# Patient Record
Sex: FEMALE | URBAN - METROPOLITAN AREA
[De-identification: names, ages, dates, MRNs, and addresses within clinical notes are randomized per-mention and may not be internally consistent; named-entity substitution may affect disease eponyms.]

---

## 2020-11-05 ENCOUNTER — EMERGENCY (EMERGENCY)
Facility: HOSPITAL | Age: 45
LOS: 1 days | Discharge: HOME | End: 2020-11-05
Attending: EMERGENCY MEDICINE | Admitting: EMERGENCY MEDICINE
Payer: MEDICAID

## 2020-11-05 VITALS
SYSTOLIC BLOOD PRESSURE: 121 MMHG | HEART RATE: 115 BPM | RESPIRATION RATE: 18 BRPM | OXYGEN SATURATION: 99 % | DIASTOLIC BLOOD PRESSURE: 62 MMHG | TEMPERATURE: 98 F

## 2020-11-05 LAB
ALBUMIN SERPL ELPH-MCNC: 3.3 G/DL — SIGNIFICANT CHANGE UP (ref 3.3–5)
ALP SERPL-CCNC: 52 U/L — SIGNIFICANT CHANGE UP (ref 40–120)
ALT FLD-CCNC: 17 U/L — SIGNIFICANT CHANGE UP (ref 4–33)
ANION GAP SERPL CALC-SCNC: 10 MMO/L — SIGNIFICANT CHANGE UP (ref 7–14)
APAP SERPL-MCNC: < 15 UG/ML — LOW (ref 15–25)
AST SERPL-CCNC: 24 U/L — SIGNIFICANT CHANGE UP (ref 4–32)
BASOPHILS # BLD AUTO: 0.01 K/UL — SIGNIFICANT CHANGE UP (ref 0–0.2)
BASOPHILS NFR BLD AUTO: 0.2 % — SIGNIFICANT CHANGE UP (ref 0–2)
BASOPHILS NFR SPEC: 0 % — SIGNIFICANT CHANGE UP (ref 0–2)
BILIRUB SERPL-MCNC: 0.8 MG/DL — SIGNIFICANT CHANGE UP (ref 0.2–1.2)
BUN SERPL-MCNC: 20 MG/DL — SIGNIFICANT CHANGE UP (ref 7–23)
CALCIUM SERPL-MCNC: 8.6 MG/DL — SIGNIFICANT CHANGE UP (ref 8.4–10.5)
CHLORIDE SERPL-SCNC: 105 MMOL/L — SIGNIFICANT CHANGE UP (ref 98–107)
CK SERPL-CCNC: 329 U/L — HIGH (ref 25–170)
CO2 SERPL-SCNC: 22 MMOL/L — SIGNIFICANT CHANGE UP (ref 22–31)
CREAT SERPL-MCNC: 0.65 MG/DL — SIGNIFICANT CHANGE UP (ref 0.5–1.3)
EOSINOPHIL # BLD AUTO: 0.07 K/UL — SIGNIFICANT CHANGE UP (ref 0–0.5)
EOSINOPHIL NFR BLD AUTO: 1.2 % — SIGNIFICANT CHANGE UP (ref 0–6)
EOSINOPHIL NFR FLD: 0.9 % — SIGNIFICANT CHANGE UP (ref 0–6)
ETHANOL BLD-MCNC: < 10 MG/DL — SIGNIFICANT CHANGE UP
GIANT PLATELETS BLD QL SMEAR: PRESENT — SIGNIFICANT CHANGE UP
GLUCOSE SERPL-MCNC: 87 MG/DL — SIGNIFICANT CHANGE UP (ref 70–99)
HCG SERPL-ACNC: < 5 MIU/ML — SIGNIFICANT CHANGE UP
HCT VFR BLD CALC: 30.3 % — LOW (ref 34.5–45)
HGB BLD-MCNC: 10 G/DL — LOW (ref 11.5–15.5)
IMM GRANULOCYTES NFR BLD AUTO: 0.3 % — SIGNIFICANT CHANGE UP (ref 0–1.5)
LYMPHOCYTES # BLD AUTO: 2.64 K/UL — SIGNIFICANT CHANGE UP (ref 1–3.3)
LYMPHOCYTES # BLD AUTO: 44.4 % — HIGH (ref 13–44)
LYMPHOCYTES NFR SPEC AUTO: 37.7 % — SIGNIFICANT CHANGE UP (ref 13–44)
MCHC RBC-ENTMCNC: 21.4 PG — LOW (ref 27–34)
MCHC RBC-ENTMCNC: 33 % — SIGNIFICANT CHANGE UP (ref 32–36)
MCV RBC AUTO: 64.7 FL — LOW (ref 80–100)
MONOCYTES # BLD AUTO: 0.68 K/UL — SIGNIFICANT CHANGE UP (ref 0–0.9)
MONOCYTES NFR BLD AUTO: 11.4 % — SIGNIFICANT CHANGE UP (ref 2–14)
MONOCYTES NFR BLD: 4.4 % — SIGNIFICANT CHANGE UP (ref 2–9)
NEUTROPHIL AB SER-ACNC: 50.9 % — SIGNIFICANT CHANGE UP (ref 43–77)
NEUTROPHILS # BLD AUTO: 2.52 K/UL — SIGNIFICANT CHANGE UP (ref 1.8–7.4)
NEUTROPHILS NFR BLD AUTO: 42.5 % — LOW (ref 43–77)
NRBC # FLD: 0 K/UL — SIGNIFICANT CHANGE UP (ref 0–0)
PLATELET # BLD AUTO: 196 K/UL — SIGNIFICANT CHANGE UP (ref 150–400)
PLATELET COUNT - ESTIMATE: NORMAL — SIGNIFICANT CHANGE UP
PMV BLD: 9.6 FL — SIGNIFICANT CHANGE UP (ref 7–13)
POTASSIUM SERPL-MCNC: 3.9 MMOL/L — SIGNIFICANT CHANGE UP (ref 3.5–5.3)
POTASSIUM SERPL-SCNC: 3.9 MMOL/L — SIGNIFICANT CHANGE UP (ref 3.5–5.3)
PROT SERPL-MCNC: 6.2 G/DL — SIGNIFICANT CHANGE UP (ref 6–8.3)
RBC # BLD: 4.68 M/UL — SIGNIFICANT CHANGE UP (ref 3.8–5.2)
RBC # FLD: 15.7 % — HIGH (ref 10.3–14.5)
SALICYLATES SERPL-MCNC: < 5 MG/DL — LOW (ref 15–30)
SARS-COV-2 RNA SPEC QL NAA+PROBE: SIGNIFICANT CHANGE UP
SODIUM SERPL-SCNC: 137 MMOL/L — SIGNIFICANT CHANGE UP (ref 135–145)
TARGETS BLD QL SMEAR: SLIGHT — SIGNIFICANT CHANGE UP
TSH SERPL-MCNC: 1.91 UIU/ML — SIGNIFICANT CHANGE UP (ref 0.27–4.2)
VARIANT LYMPHS # BLD: 6.1 % — SIGNIFICANT CHANGE UP
WBC # BLD: 5.94 K/UL — SIGNIFICANT CHANGE UP (ref 3.8–10.5)
WBC # FLD AUTO: 5.94 K/UL — SIGNIFICANT CHANGE UP (ref 3.8–10.5)

## 2020-11-05 PROCEDURE — 93010 ELECTROCARDIOGRAM REPORT: CPT

## 2020-11-05 PROCEDURE — 99285 EMERGENCY DEPT VISIT HI MDM: CPT | Mod: 25

## 2020-11-05 RX ORDER — HALOPERIDOL DECANOATE 100 MG/ML
5 INJECTION INTRAMUSCULAR ONCE
Refills: 0 | Status: COMPLETED | OUTPATIENT
Start: 2020-11-05 | End: 2020-11-05

## 2020-11-05 RX ADMIN — Medication 2 MILLIGRAM(S): at 14:00

## 2020-11-05 RX ADMIN — HALOPERIDOL DECANOATE 5 MILLIGRAM(S): 100 INJECTION INTRAMUSCULAR at 14:00

## 2020-11-05 NOTE — ED PROVIDER NOTE - PHYSICAL EXAMINATION
tunneling lesions to upper back with areas scabbed over areas as well as red raised areas and areas with vesicles upper back multiple areas of excoriation.  No erythema multiforme, migrans or concerning features.

## 2020-11-05 NOTE — ED BEHAVIORAL HEALTH NOTE - BEHAVIORAL HEALTH NOTE
Met with patient. Patient appears sedated, falling asleep and mumbling words. She reports she is "itchy," and requested cream for her back. She denied PPH or any current psychiatric symptoms but at this time required prompting multiple times due to sedation. Unable to fully evaluate patient at this time.     Reviewed Psyckes- Patient is a 45 year old female with a psychiatric hx of Schizophrenia. Last hospitalized at [facility] 7/18-8/10 2020. PMH per le- HTN, hx of scabies 10/19     Medication history: Risperdal, Haldol, Geodon, Hydroxyzine, Haldol Dec. Last prescribed Risperdal 4mg BID 8/10/20.     No [place] .     No collateral available. Met with patient. Patient appears sedated, falling asleep and mumbling words. She reports she is "itchy," and requested cream for her back. She denied SI/HI. She denied going through mailboxes (per triage note was taking mail from mailboxes). She required prompting multiple times due to sedation. Unable to fully evaluate patient at this time due to sedation.     Reviewed Psyckes- Patient is a 45 year old female with a psychiatric hx of Schizophrenia. Last hospitalized at [facility] 7/18-8/10 2020. PMH per psadonis- HTN, hx of scabies 10/19     Medication history: Risperdal, Haldol, Geodon, Hydroxyzine, Haldol Dec. Last prescribed Risperdal 4mg BID 8/10/20.     No [place] .     No collateral available.

## 2020-11-05 NOTE — ED ADULT TRIAGE NOTE - CHIEF COMPLAINT QUOTE
[employer] called for compliant of pt. going through people's mailboxes. When PD arrived pt. started running. EMS states pt. denying all medical history. As per PD, pt. was last hospitalized Nov '19 for schizophrenia. Pt. yelling and cursing in triage. Unable to get vitals.  NP Parish notified and pt. brought to  for eval.

## 2020-11-05 NOTE — ED ADULT NURSE NOTE - OBJECTIVE STATEMENT
The patient is a 45y Female BIB EMS/[employer] (handcuffed, not arrested) for  psychiatric evaluation. Pt was found by police near [address] [place] [address] and [facility] stealing mail from residential mailboxes. Pt agitated on arrival, cursing staff, using homophopic slurs. Not answering questions.

## 2020-11-05 NOTE — ED PROVIDER NOTE - CLINICAL SUMMARY MEDICAL DECISION MAKING FREE TEXT BOX
45 year old female history of schizophrenia, htn per PD/EMS presents for erratic behavior, concern for decompensation of schizophrenia.  Skin lesions concerning for scabies.  Due to agitation to ensure safety of patient and staff, patient medicated.  Will transfer to main ED for isolation of scabies, consult psychiatry, do psych labs / ekg 45 year old female history of schizophrenia, htn per PD/EMS presents for erratic behavior, concern for decompensation of schizophrenia.  Skin lesions non-concerning.  Due to agitation to ensure safety of patient and staff, patient medicated.  Medical evaluation performed. There is no clinical evidence of intoxication or any acute medical problem requiring immediate intervention. Patient is awaiting psychiatric consultation. Final disposition will be determined by psychiatrist.

## 2020-11-05 NOTE — ED BEHAVIORAL HEALTH NOTE - BEHAVIORAL HEALTH NOTE
Worker called numbers provided by DEVYN Lugo for collateral information. Worker called [phone] and line was continuously busy. Worker then called [phone] and person picked up states she does not know patient. Worker then called [phone] and left a message for call back on voicemail.

## 2020-11-05 NOTE — ED PROVIDER NOTE - OBJECTIVE STATEMENT
45 year old female history of schizophrenia, htn per PD/EMS presents for erratic behavior.  PD reports that patient was going house to house and taking mail from mailboxes and screaming.  PD reports upon their arrival patient ran away from them and had to be chased and detained until EMS arrived.  Patient initially agitated, screaming at staff and refusing to talk to provider. Patient only complaint is that she is pruritic due to lesions on her back.

## 2020-11-05 NOTE — ED BEHAVIORAL HEALTH NOTE - BEHAVIORAL HEALTH NOTE
attempted to re-evaluate this Pt bedside at 630PM but she remains sedated.     per chart review: 45/F with hx of Schizophrenia, multiple in-Pt psych admissions with latest admission at [facility] in 7/18-8/10 2020.  Today, presented to the ED BIB EMS accompanied by police after 911 was activated as Pt had been "going through people's mailboxes".   EMS reported that onsite upon police's arrival, the Pt started running.  On initial presentation, the Pt was uncooperative, yelling and cursing in triage.  She refuse to have VS done.   Staff attempted to verbally redirect her multiple times but she became increasingly agitated whilst at the main  ED.  As Pt did not respond to these encouragement and her attitude was getting worse, she ended up being medicated with Haldol 5mg IM + Ativan 2mg IM at 235PM.     Vital Signs Last 24 Hrs  T(C): 36.7 (05 Nov 2020 14:00), Max: 36.7 (05 Nov 2020 14:00)  T(F): 98 (05 Nov 2020 14:00), Max: 98 (05 Nov 2020 14:00)  HR: 115 (05 Nov 2020 14:00) (115 - 115)  BP: 121/62 (05 Nov 2020 14:00) (121/62 - 121/62)  BP(mean): --  RR: 18 (05 Nov 2020 14:00) (18 - 18)  SpO2: 99% (05 Nov 2020 14:00) (99% - 99%)    LABS:                        10.0   5.94  )-----------( 196      ( 05 Nov 2020 17:34 )             30.3     05 Nov 2020 17:34    137    |  105    |  20     ----------------------------<  87     3.9     |  22     |  0.65     Ca    8.6        05 Nov 2020 17:34    TPro  6.2    /  Alb  3.3    /  TBili  0.8    /  DBili  x      /  AST  24     /  ALT  17     /  AlkPhos  52     05 Nov 2020 17:34        PLAN:   to reassess once sensorium improves

## 2020-11-05 NOTE — ED PROVIDER NOTE - PROGRESS NOTE DETAILS
Patient now calm and cooperative after medication administered. Dr. French Shelley DO (ED ATTENDING): Patient has been sleeping and sedated for many hours but now patient now more awake but still disorganized and psychotic. Unclear baseline and psych unable to get collateral. Psych team requesting more haldol and ativan PO then will re-assess in morning to determine ultimate disposition MD CHO:  I received s/o on this pt from Dr. Shelley.  Pt pending re-eval by psych.  Discussed with psych attg, pt ready for dc, they will provide metrocard.

## 2020-11-05 NOTE — ED PROVIDER NOTE - PATIENT PORTAL LINK FT
You can access the FollowMyHealth Patient Portal offered by [facility] by registering at the following website: [email or web address]. By joining [facility]’s FollowMyHealth portal, you will also be able to view your health information using other applications (apps) compatible with our system.

## 2020-11-06 VITALS
HEART RATE: 78 BPM | OXYGEN SATURATION: 99 % | TEMPERATURE: 98 F | DIASTOLIC BLOOD PRESSURE: 62 MMHG | SYSTOLIC BLOOD PRESSURE: 147 MMHG | RESPIRATION RATE: 18 BRPM

## 2020-11-06 DIAGNOSIS — F20.9 SCHIZOPHRENIA, UNSPECIFIED: ICD-10-CM

## 2020-11-06 LAB
SARS-COV-2 IGG SERPL QL IA: NEGATIVE — SIGNIFICANT CHANGE UP
SARS-COV-2 IGM SERPL IA-ACNC: <3.8 AU/ML — SIGNIFICANT CHANGE UP

## 2020-11-06 PROCEDURE — 90792 PSYCH DIAG EVAL W/MED SRVCS: CPT

## 2020-11-06 RX ORDER — HALOPERIDOL DECANOATE 100 MG/ML
5 INJECTION INTRAMUSCULAR ONCE
Refills: 0 | Status: COMPLETED | OUTPATIENT
Start: 2020-11-06 | End: 2020-11-06

## 2020-11-06 RX ORDER — ACETAMINOPHEN 500 MG
650 TABLET ORAL ONCE
Refills: 0 | Status: COMPLETED | OUTPATIENT
Start: 2020-11-06 | End: 2020-11-06

## 2020-11-06 RX ADMIN — Medication 2 MILLIGRAM(S): at 05:56

## 2020-11-06 RX ADMIN — HALOPERIDOL DECANOATE 5 MILLIGRAM(S): 100 INJECTION INTRAMUSCULAR at 05:56

## 2020-11-06 RX ADMIN — Medication 650 MILLIGRAM(S): at 05:56

## 2020-11-06 NOTE — ED BEHAVIORAL HEALTH ASSESSMENT NOTE - HPI (INCLUDE ILLNESS QUALITY, SEVERITY, DURATION, TIMING, CONTEXT, MODIFYING FACTORS, ASSOCIATED SIGNS AND SYMPTOMS)
45 year old woman, history of schizophrenia, last admission was 7/18-8/10 2020 at [facility], presenting with agitation and disorganized speech and behavior. The patient was brought to the ED by EMS after she was found going into neighborhood mailboxes and running from the police.    On interview the patient is guarded and hostile. She remained in bed, covered in a blanket with only one eye exposed. She reports that she is in the hospital for back pain, which is chronic. When asked about her mood, she replied "you have to check my diet, because you eat what you feel". She denies AVH, SIIP/HIIP. She became increasingly hostile and deflected questions answering with "look in the chart". The interview was ended due to patient's increasing agitation.    As per Le- Patient is a 45 year old female with a psychiatric hx of Schizophrenia. Last hospitalized at [facility] 7/18-8/10 2020. PMH per le- HTN, hx of scabies 10/19, Medication history: Risperdal, Haldol, Geodon, Hydroxyzine, Haldol Dec. Last prescribed Risperdal 4mg BID 8/10/20.    The patient was unable to provide sources for further collateral 45 year old woman, history of schizophrenia, last admission was 7/18-8/10 2020 at [facility], presenting with agitation and disorganized speech and behavior. The patient was brought to the ED by EMS after she was found going into neighborhood mailboxes and running from the police.    On interview the patient is guarded and hostile. She remained in bed, covered in a blanket with only one eye exposed. She reports that she is in the hospital for back pain, which is chronic. When asked about her mood, she replied "you have to check my diet, because you eat what you feel". She denies AVH, SIIP/HIIP. She became increasingly hostile and deflected questions answering with "look in the chart". The interview was ended due to patient's increasing agitation.    As per Le- Patient is a 45 year old female with a psychiatric hx of Schizophrenia. Last hospitalized at [facility] 7/18-8/10 2020. PMH per le- HTN, hx of scabies 10/19, Medication history: Risperdal, Haldol, Geodon, Hydroxyzine, Haldol Dec. Last prescribed Risperdal 4mg BID 8/10/20.    The patient was unable to provide sources for further collateral    Pt was reassessed at 8:45:  She is A & O x 4, calmer, cooperative with good eye contact, no PMA or PMR.  She reports mood as “good”, affect is reactive, congruent. She denies EMS report of her going through other people’s mailboxes, reports that she came to the hospital because she was having feet and back pain with pruritus.  She reports lives with her family in [place], but unable to furnish any phone numbers or further collateral.  Pt denies PPH, denies prescribed or taking any medications.  She could not give psychiatrist name, but reports that she went to a clinic 2 weeks ago in [place] but found out her insurance was inactive.  Writer furnish information on [employer]SI office where she can go to have her insurance issues revisited. Patient denies current symptoms of depression, antonia, anxiety, psychosis, suicidal/homicidal ideations, intent or plans, denies auditory/visual hallucinations and there is no evidence of acute mood or psychotic episode on exam. Pt reports feeling better, is asking to go home.  She is oriented to situation and verbally agreed to f/u with her medical/psychiatric care. 45 year old woman, history of schizophrenia, last admission was 7/18-8/10 2020 at [facility], presenting with agitation and disorganized speech and behavior. The patient was brought to the ED by EMS after she was found going into neighborhood mailboxes and running from the police.    On interview the patient is guarded and hostile. She remained in bed, covered in a blanket with only one eye exposed. She reports that she is in the hospital for back pain, which is chronic. When asked about her mood, she replied "you have to check my diet, because you eat what you feel". She denies AVH, SIIP/HIIP. She became increasingly hostile and deflected questions answering with "look in the chart". The interview was ended due to patient's increasing agitation.    As per Le- Patient is a 45 year old female with a psychiatric hx of Schizophrenia. Last hospitalized at [facility] 7/18-8/10 2020. PMH per le- HTN, hx of scabies 10/19, Medication history: Risperdal, Haldol, Geodon, Hydroxyzine, Haldol Dec. Last prescribed Risperdal 4mg BID 8/10/20.    The patient was unable to provide sources for further collateral    Pt was reassessed at 8:45:  She is A & O x 4, calmer, cooperative with good eye contact, no PMA or PMR.  She reports mood as “good”, affect is reactive, congruent. She denies EMS report of her going through other people’s mailboxes, reports that she came to the hospital because she was having feet and back pain with pruritus.  She reports lives with her family in [place], but unable to furnish any phone numbers or further collateral.  Pt denies PPH, denies prescribed or taking any medications, denies current substance use.   She could not give psychiatrist name, but reports that she went to a clinic 2 weeks ago in [place] but found out her insurance was inactive.  Writer furnish information on SSI office where she can go to have her insurance issues revisited. Patient denies current symptoms of depression, antonia, anxiety, psychosis, suicidal/homicidal ideations, intent or plans, denies auditory/visual hallucinations and there is no evidence of acute mood or psychotic episode on exam. Pt reports feeling better, is asking to go home.  She is oriented to situation and verbally agreed to f/u with her medical/psychiatric care. 45 year old woman, history of schizophrenia, last admission was 7/18-8/10 2020 at [facility], presenting with agitation and disorganized speech and behavior. The patient was brought to the ED by EMS after she was found going into neighborhood mailboxes and running from the police.    On interview the patient is guarded and hostile. She remained in bed, covered in a blanket with only one eye exposed. She reports that she is in the hospital for back pain, which is chronic. When asked about her mood, she replied "you have to check my diet, because you eat what you feel". She denies AVH, SIIP/HIIP. She became increasingly hostile and deflected questions answering with "look in the chart". The interview was ended due to patient's increasing agitation.    As per Le- Patient is a 45 year old female with a psychiatric hx of Schizophrenia. Last hospitalized at [facility] 7/18-8/10 2020. PMH per le- HTN, hx of scabies 10/19, Medication history: Risperdal, Haldol, Geodon, Hydroxyzine, Haldol Dec. Last prescribed Risperdal 4mg BID 8/10/20.    The patient was unable to provide sources for further collateral    Pt was reassessed at 8:45:  She is A & O x 4, calmer, cooperative with good eye contact, no PMA or PMR.  She reports mood as “good”, affect is reactive, congruent. She denies EMS report of her going through other people’s mailboxes, reports that she came to the hospital because she was having feet and back pain with pruritus.  She reports lives with her family in [place], but unable to furnish any phone numbers or further collateral.  Pt denies PPH, denies prescribed or taking any medications, denies current substance use.   She could not give psychiatrist name, but reports that she went to a clinic 2 weeks ago in [place] but found out her insurance was inactive.  Writer furnish information on SSI office where she can go to have her insurance issues revisited. Patient denies current symptoms of depression, antonia, anxiety, psychosis, denies suicidal/homicidal ideations, intent or plans, denies auditory/visual hallucinations and there is no evidence of acute mood or psychotic episode on exam. Pt reports feeling better, is asking to go home.  She is oriented to situation and verbally agreed to f/u with her medical/psychiatric care.

## 2020-11-06 NOTE — ED BEHAVIORAL HEALTH ASSESSMENT NOTE - PSYCHIATRIC ISSUES AND PLAN (INCLUDE STANDING AND PRN MEDICATION)
CRAMPS/VAGINAL DISCHARGE will give Haldol 5mg po and Ativan 2mg po and re-eval; possibly will be less irritable and more cooperative

## 2020-11-06 NOTE — ED BEHAVIORAL HEALTH ASSESSMENT NOTE - DESCRIPTION
The patient was extremely agitated at triage and was yelling at providers and was unable to be redirected. She received Haldol 5mg and Ativan 2mg for agitation.  Vital Signs Last 24 Hrs  T(C): 36.7 (05 Nov 2020 14:00), Max: 36.7 (05 Nov 2020 14:00)  T(F): 98 (05 Nov 2020 14:00), Max: 98 (05 Nov 2020 14:00)  HR: 115 (05 Nov 2020 14:00) (115 - 115)  BP: 121/62 (05 Nov 2020 14:00) (121/62 - 121/62)  BP(mean): --  RR: 18 (05 Nov 2020 14:00) (18 - 18)  SpO2: 99% (05 Nov 2020 14:00) (99% - 99%) See HPI Unable to assess HTN The patient was extremely agitated at triage and was yelling at providers and was unable to be redirected. She received Haldol 5mg and Ativan 2mg for agitation; currently calm and in good behavioral control  Vital Signs Last 24 Hrs  T(C): 36.7 (05 Nov 2020 14:00), Max: 36.7 (05 Nov 2020 14:00)  T(F): 98 (05 Nov 2020 14:00), Max: 98 (05 Nov 2020 14:00)  HR: 115 (05 Nov 2020 14:00) (115 - 115)  BP: 121/62 (05 Nov 2020 14:00) (121/62 - 121/62)  BP(mean): --  RR: 18 (05 Nov 2020 14:00) (18 - 18)  SpO2: 99% (05 Nov 2020 14:00) (99% - 99%) see HPI

## 2020-11-06 NOTE — ED BEHAVIORAL HEALTH ASSESSMENT NOTE - NS ED BHA PLAN TR SAFTETY PLAN DISCUSSED2 FT
Extensive safety planning performed with patient. In addition to extensive discussion of safe places patient can go to, distraction techniques, coping skills, motivational interviewing and who patient can call in the event of crisis including various hotlines. Patient agreeing verbally to return patient to ER or call 911 if symptoms worsen or patient has urges to harm self or others.

## 2020-11-06 NOTE — ED BEHAVIORAL HEALTH ASSESSMENT NOTE - CASE SUMMARY
Please call scheduling  Yes- both legs, peripheral neuropathy vs lumbar radiculopathy PT seen, chart reviewed, case discussed with team, supervision provided. Pt is a 44yo AAF with h/o schizophrenia, who was BIBA after she was found rummaging through mailboxes. She was agitated after arrival, and required STAT Meds for safety. Pt slept and was able to minimally be interview later in the night. She is highly irritable, saying "check the chart" to most questions. She refuses to state where she lives, or what meds she may be taking. She does not remove sheet from over her head. It is unclear if this is near patients baseline, or if she is decompensated. PT will be offered Haldol 5mg po and Ativan 2mg po to see if irritability can lessen and pt can be more amenable for interview. If pt is calmer, and more organized, consider d/c; if she remains agitated and irritable, consider admission. The patient is a 45 year old schizophrenic, history of inpatient hospitalization presenting with disorganization and irritability. She was agitated on arrival to ED and was medicated with good effect.     Pt was reassessed at 8:45 AM. She is currently calm, cooperative, and in good behavioral control. She denies SI/HI. She denies symptoms of psychosis, antonia, or depression. She does not present an acute danger to self or others and does not meet criteria for involuntary psychiatric admission. She declines voluntary psychiatric admission at this time.

## 2020-11-06 NOTE — ED BEHAVIORAL HEALTH ASSESSMENT NOTE - RISK ASSESSMENT
The patient  possesses acute risk factors for suicide including psychosis and possible mood episode. Chronic risk factors include schizophrenia, inpatient hospitalization, low pre morbid functioning, and poor insight into illness. Protective factors are uncertain. The patient denies SIIP at present. In light of the above the patient possesses elevated acute and chronic risk of suicide or self harm. Moderate Acute Suicide Risk Chronic risk factors include h/o schizophrenia, h/o inpatient hospitalization, low pre morbid functioning, and poor insight into illness.   Protective factors include no suicide attempts, no violence history, no access to guns, no global insomnia, no substance abuse, no suicidal ideation or homicidal ideation, future-oriented, identifies reasons for living.  Pt is not at acutely elevated risk of harm to self or others. Low Acute Suicide Risk

## 2020-11-06 NOTE — ED BEHAVIORAL HEALTH ASSESSMENT NOTE - OTHER
unable to assess See above Did not assess due to agitation laying bed Unable to assess see above did not assess

## 2020-11-06 NOTE — ED BEHAVIORAL HEALTH ASSESSMENT NOTE - VIOLENCE RISK FACTORS:
Noncompliance with treatment/Affective dysregulation/Lack of insight into violence risk/need for treatment

## 2020-11-06 NOTE — ED BEHAVIORAL HEALTH ASSESSMENT NOTE - DIFFERENTIAL
psychosis- likely due to underlying schizophrenia vs Bipolar Tori with psychotic features vs substance induced psychosis

## 2020-11-06 NOTE — ED BEHAVIORAL HEALTH ASSESSMENT NOTE - SUMMARY
The patient is a 45 year old schizophrenic, history of inpatient hospitalization presenting with disorganization and irritability. Found going into mailboxes, ran from the police when approached.    Interview was limited due to agitation. The patient appears guarded and hostile, her speech is disorganized with loose associations. Limited collateral obtained from [employer]. The patient is likely chronically psychotic with unclear baseline. The patient is a 45 year old schizophrenic, history of inpatient hospitalization presenting with disorganization and irritability. Found going into mailboxes, ran from the police when approached.    Interview was limited due to agitation. The patient appears guarded and hostile, her speech is disorganized with loose associations. Limited collateral obtained from [employer]. The patient is likely chronically psychotic with unclear baseline.    Pt was reassessed at 8:45, she is calmer and receptive to write questions.  She does not appear acutely depressed, manic or psychotic on exam.  She denies wanting to hurt herself or kill herself, denies any current suicidal ideation intent or plan, denies homicidal ideation intent or plan.  She does not wish to be admitted and there is no evidence on exam that pt is presenting as an imminent risk for harm to self, and does not meet criteria for involuntary in-patient hospitalization.   Pt will be discharge home with outpatient referral.  Outpatient resources given includes Crisis Center.   Pt encourage to seek  emergency care or call 911 if her symptoms worsen. The patient is a 45 year old schizophrenic, history of inpatient hospitalization presenting with disorganization and irritability. Found going into mailboxes, ran from the police when approached.    Interview was limited due to agitation. The patient appears guarded and hostile, her speech is disorganized with loose associations. Limited collateral obtained from [employer]. The patient is likely chronically psychotic with unclear baseline.    Pt was reassessed at 8:45, she is calmer and receptive to write questions.  She does not appear acutely depressed, manic or psychotic on exam.  She denies wanting to hurt herself or kill herself, denies any current suicidal ideation intent or plan, denies homicidal ideation intent or plan.  She does not wish to be admitted and there is no evidence on exam that pt is presenting as an imminent risk for harm to self or others, and does not meet criteria for involuntary in-patient hospitalization.   Pt will be discharge home with outpatient referral.  Outpatient resources given includes Crisis Center.   Pt encourage to seek  emergency care or call 911 if her symptoms worsen. The patient is a 45 year old schizophrenic, history of inpatient hospitalization presenting with disorganization and irritability. Found going into mailboxes, ran from the police when approached.    Interview was limited due to agitation. The patient appears guarded and hostile, her speech is disorganized with loose associations. Limited collateral obtained from [employer]. The patient is likely chronically psychotic with unclear baseline.    Pt was reassessed at 8:45, she is calmer and receptive to write questions.  She does not appear acutely depressed, manic or psychotic on exam.  She denies wanting to hurt herself or kill herself, denies any current suicidal ideation intent or plan, denies homicidal ideation intent or plan.  She does not wish to be admitted and there is no evidence on exam that pt is presenting as an imminent risk for harm to self or others, and does not meet criteria for involuntary in-patient hospitalization.   -Pt will be discharge home with outpatient referral.  -Outpatient resources given includes Crisis Center.   -Pt encourage to seek  emergency care or call 911 if her symptoms worsen. The patient is a 45 year old schizophrenic, history of inpatient hospitalization presenting with disorganization and irritability. Found going into mailboxes, ran from the police when approached.    Interview was limited due to agitation. The patient appears guarded and hostile, her speech is disorganized with loose associations. Limited collateral obtained from [employer]. The patient is likely chronically psychotic with unclear baseline.    Pt was reassessed at 8:45, she is calmer and receptive to writer's questions.  She does not appear acutely depressed, manic or psychotic on exam.  She denies wanting to hurt herself or kill herself, denies any current suicidal ideation intent or plan, denies homicidal ideation intent or plan.  She does not wish to be admitted and there is no evidence on exam that pt is presenting as an imminent risk for harm to self or others, and does not meet criteria for involuntary in-patient hospitalization.   -Pt will be discharge home with outpatient referral.  -Outpatient resources given includes Crisis Center.   -Pt encourage to seek  emergency care or call 911 if her symptoms worsen.

## 2020-11-06 NOTE — ED BEHAVIORAL HEALTH ASSESSMENT NOTE - SUICIDE RISK FACTORS
Agitation/Severe Anxiety/Panic/Unable to engage in safety planning/Chronic pain Psychotic disorder current/past/Unable to engage in safety planning/Agitation/Severe Anxiety/Panic/Chronic pain Psychotic disorder current/past/Chronic pain

## 2020-11-06 NOTE — ED BEHAVIORAL HEALTH NOTE - BEHAVIORAL HEALTH NOTE
Writer was informed pt was medically and psychiatrically cleared for discharge, but requested a metrocard. Metrocard serial #[id number]

## 2020-11-06 NOTE — ED BEHAVIORAL HEALTH ASSESSMENT NOTE - SUICIDE PROTECTIVE FACTORS
None known Identifies reasons for living/Responsibility to family and others/Supportive social network of family or friends/Has future plans

## 2020-11-06 NOTE — ED BEHAVIORAL HEALTH ASSESSMENT NOTE - SAFETY PLAN ADDT'L DETAILS
Safety plan discussed with.../Education provided regarding environmental safety / lethal means restriction/Provision of National Suicide Prevention Lifeline 6-568-071-UHLP (1530)

## 2020-11-06 NOTE — ED ADULT NURSE REASSESSMENT NOTE - NS ED NURSE REASSESS COMMENT FT1
Break coverage RN: Pt calm and cooperative at this time. Medicated as ordered. Vitals stable as noted.
Pt remains in bed, refusing to speak to psychiatrist. Pt opens her eyes, NAD, even unlabored respirations observed, however uncooperative with reassessment (no agitation). pt observed getting up and ambulating to bathroom minutes later. Refusing VS at this time. Will continue to monitor for safety.
Received pt at change of shift. Pt is currently laying in bed, NAD. Pt opens her eyes to verbal stimuli, not speaking and refusing to have VS taken. Even unlabored resp observed. Pending psych reassessment for safe dispo. Will continue to monitor for safety.
Received report from covering RN, pt calm & cooperative denies si/hi/avh presently pt cleared by psych d/c by MD resources provided by [facility] pt verbalizing understanding.
Pt remains sleeping, s/p meds received. NAD, even unlabored respirations observed. Pending psych reeval when awake and cooperative. Will continue to monitor for safety.

## 2022-05-30 NOTE — ED ADULT NURSE NOTE - SUICIDE SCREENING QUESTION 2
Patient refused
This scribe's documentation has been prepared under my direction and personally reviewed by me in its entirety. I confirm that the note above accurately reflects all work, treatment, procedures, and medical decision making performed by me.